# Patient Record
Sex: FEMALE | Race: WHITE | NOT HISPANIC OR LATINO | Employment: FULL TIME | URBAN - METROPOLITAN AREA
[De-identification: names, ages, dates, MRNs, and addresses within clinical notes are randomized per-mention and may not be internally consistent; named-entity substitution may affect disease eponyms.]

---

## 2023-09-21 PROBLEM — I26.93 SINGLE SUBSEGMENTAL PULMONARY EMBOLISM WITHOUT ACUTE COR PULMONALE (MULTI): Status: ACTIVE | Noted: 2023-09-21

## 2023-09-21 PROBLEM — R03.0 BLOOD PRESSURE ELEVATED WITHOUT HISTORY OF HTN: Status: ACTIVE | Noted: 2023-09-21

## 2023-09-21 PROBLEM — M47.816 LUMBAR SPONDYLOSIS: Status: ACTIVE | Noted: 2023-09-21

## 2023-09-21 PROBLEM — S22.059A: Status: ACTIVE | Noted: 2023-09-21

## 2023-09-21 PROBLEM — F43.89 ADJUSTMENT REACTION TO CHRONIC STRESS: Status: ACTIVE | Noted: 2023-09-21

## 2023-09-21 PROBLEM — R91.8 LUNG MASS: Status: ACTIVE | Noted: 2023-09-21

## 2023-09-21 PROBLEM — H81.10 BENIGN PAROXYSMAL POSITIONAL VERTIGO: Status: ACTIVE | Noted: 2023-09-21

## 2023-09-21 PROBLEM — M17.0 LOCALIZED OSTEOARTHRITIS OF KNEES, BILATERAL: Status: ACTIVE | Noted: 2023-09-21

## 2023-09-21 PROBLEM — F17.210 CIGARETTE SMOKER MOTIVATED TO QUIT: Status: ACTIVE | Noted: 2023-09-21

## 2023-09-21 PROBLEM — M24.559: Status: ACTIVE | Noted: 2023-09-21

## 2023-09-21 PROBLEM — F32.1 CURRENT MODERATE EPISODE OF MAJOR DEPRESSIVE DISORDER WITHOUT PRIOR EPISODE (MULTI): Status: ACTIVE | Noted: 2023-09-21

## 2023-09-21 PROBLEM — Z85.89 HISTORY OF CANCER METASTATIC TO BRAIN: Status: ACTIVE | Noted: 2023-09-21

## 2023-09-21 PROBLEM — S22.040A: Status: ACTIVE | Noted: 2023-09-21

## 2023-09-21 RX ORDER — LIDOCAINE AND PRILOCAINE 25; 25 MG/G; MG/G
CREAM TOPICAL
COMMUNITY

## 2023-09-21 RX ORDER — OXYCODONE HYDROCHLORIDE 5 MG/1
TABLET ORAL EVERY 6 HOURS PRN
COMMUNITY
Start: 2022-12-27

## 2023-09-21 RX ORDER — LORAZEPAM 0.5 MG/1
TABLET ORAL
COMMUNITY
Start: 2022-08-31

## 2023-09-21 RX ORDER — PAROXETINE HYDROCHLORIDE 40 MG/1
TABLET, FILM COATED ORAL
COMMUNITY

## 2023-09-21 RX ORDER — PREDNISONE 20 MG/1
TABLET ORAL
COMMUNITY
Start: 2021-02-16

## 2023-09-21 RX ORDER — PREDNISONE 50 MG/1
TABLET ORAL
COMMUNITY
Start: 2021-06-28

## 2023-09-27 PROBLEM — C34.90: Status: ACTIVE | Noted: 2023-09-27

## 2023-09-27 RX ORDER — HEPARIN SODIUM,PORCINE/PF 10 UNIT/ML
50 SYRINGE (ML) INTRAVENOUS AS NEEDED
OUTPATIENT
Start: 2023-10-20

## 2023-09-27 RX ORDER — HEPARIN 100 UNIT/ML
500 SYRINGE INTRAVENOUS AS NEEDED
OUTPATIENT
Start: 2023-10-20

## 2023-09-28 DIAGNOSIS — C34.90 SMALL CELL MALIGNANT NEOPLASM OF LUNG IN ADULT (MULTI): Primary | ICD-10-CM

## 2023-10-20 NOTE — PROGRESS NOTES
Consent:  Verbal consent was requested and obtained from patient on this date for a telehealth visit.    Patient ID: Christy Lizarraga is a 58 y.o. female.    Cancer History:   Treatment Synopsis:    1. Small cell lung cancer, limited stage, cT2N1 stage IIA  - Presented to ED 1/11/20 with SOB and cough, found to have RUL mass  - 1/12/20 CT abd/pelvis with indeterminate adrenal thickening   - 1/13/20 bronchoscopy revealed RUL mass, path c/w small cell   - 1/24/20 MRI brain neg  - 1/27/20 PET/CT FDG avid lung mass with extention into hilar, right paratracheal and precarinal LN, no distant mets  - C1D1 cis/etop 2/3/20  - 2/21/20 Enrolled to  and randomized to received cis/etop/atezo- C2 AND c3 delayed due to neturopenia  - 5/11/20 CT c/a/p significant reduction in primary mass, no progression  - 7/17/20 CT c/a/p and MRI brain no disease  - 11/2020 progression in brain sp GKRS  - 2/15/21 stop atezo due to pneumonitis concern    Subjective    HPI  A telephone visit (audio only) between the patient at home and the provider at [facility] [place] was utilized to provide this   telehealth service.     Ms. Christy Casarez is a 59 y/o F with a history of COPD, hypertension, tobacco use, who presents for follow-up for non-small cell lung cancer.     Since last visit, patient reports that her energy and appetite have improved since being on the hydrocortisone for her Shawn's disease. Denies any new cough, shortness of breath or pain. No complaints today. States her father has recently been diagnosed with colon cancer and family would like him referred to this clinic for monitoring.     Patient's past medical history, surgical history, family history and social history reviewed.     Objective    There were no vitals taken for this visit.     Review of Systems:   Review of Systems:    Positive per HPI, otherwise negative.     Physical Exam:   Exam deferred due to telephone call.    Performance  Status:  Symptomatic; fully ambulatory    Labs/Imaging/Pathology: personally reviewed reports and images in Epic electronic medical record system. Pertinent results as it related to the plan represented in below in assessment and plan.     Assessment/Plan    1. Small cell lung cancer, RUL, limited stage [id number] stage IIA  Patient initially presented 1/10/20 with new cough and shortness of breath.  Subsequent CT chest revealed a right upper lobe lesion measuring 2.4 x 3.6 x 2.2 cm.  No distant metastases seen on CT abdomen pelvis 1/13/20.  She underwent bronchoscopy 1/13/20  and biopsy of right upper lobe mass, final pathology revealed small cell carcinoma. MRI brain 1/24/20 negative. PET/CT 1/27/20 without distant mets. Started C1D1 cis/etop 2/3/20. Now enrolled in  and randomized to atezo plus cis/etop plus RT. C1  delayed due to prolonged neutropenia. C2 delayed due to neutropenia and sore throat. Repeat scans 5/11/20 with significant disease response, NM, tumor down 90%. Repeat MRI brain 7/17/20 with no mets.     - Since last visit, she continues to lose weight, feels weak, and overall today is having stress related to her financial situation and is having a hard time coping.  - Discussed that we could work with Social Work to see if there are any resources that would be helpful.   - Given she is having difficulty with co-pays we will do a phone visit in 6 weeks; but not formally.   - overall with significant depression and would benefit from psychiatry, will place referral to [facility]     10/31/22:  - She is 6 months out from recent scans.  - Did discuss that due to cost of imaging she prefers to limit. She would prefer to hold off on MRI brain until she has better clarification of her insurance.  - We will plan for Signatera today. She is ok to get stuck again from her port. Infusion nurse aware.   - RTC in 3 months for repeat CBC, CMP, and Signatera and we will plan for port flush in 6 weeks.      2/17/23:   - Routine follow up visit  - Overall no new changes or complaints  - Declines repeat scans, says she is still working through insurance changes/additions  - Referral for PT and optometrist entered  - Routine labs today via port  - Per Marce RN path for signatera insufficient so test cancelled  - Port flush in 6 weeks  - Dr. Leatha Mcnamara visit in 12 weeks  - 20 minutes spent directly with patient     5/22/23:  - Patient has lost a significant amount of weight and continues to have a persistent cough and SOB.   - She was recently in hospital in April for pneumonia and states despite antibiotics there has been no resolution.   - Did review after looking at her images that I do have concern there could be a neoplasm at the site of pneumonia and it would be useful to get a PET CT to further differentiate. She was agreeable to this plan.  - We will plan for a PET CT ideally within the week.  - She will call us with any new symptoms in the meantime and we will talk to her over the phone after the PET CT is completed if further treatment for her cancer is needed versus focusing on comfort.  - RTC for a phone visit after PET CT.      7/28/23:  - reviewed most recent PETCT with no evidence of recurrence  - labs unremarkable  - recent hospitalization found to have adrenal insufficiency , now improving with hydrocortisone  - pt very upset about wait today and would prefer followup in person or   - port flush in 6 weeks and with me in 12 weeks    10/23/23: Telephone call   - Patient denies any new symptoms.   - She has been dealing with her father who just got diagnosed with colon cancer.  - She still needs a port flush.   - We will plan for repeat scans in 3 months per her preference and we will check labs at that time with CBC and CBC.   - I will see her a few days after scans as an in-person visit.  - She will call us if she has any concerns or symptoms in the meantime.  - RTC in 3 months with labs and scans prior.      2. Weight loss  - now improving  - I do think a large component could be related to stress and losing her granddaughter, now with less stress after her  passed  - She does not eat regularly  - Will involve our dietician Ebony to help her increase calories      3. Cancer related pain  4. Weakness in legs and muscle spasms   - now much improved after RFA to lumbar spine  - Underwent LP and paraneoplastic panel both negative  - Patient described that her symptoms were overall improving but continues to have significant muscle spasms and pain  - We reviewed her medications and no clear culprits  - We do think apixaban may be contributing given the timing of her symptoms   - Will plan to hold Apixaban given her PE was over 3 months ago and she has had CT scans without any embolism identified  - Working with Dr. Ruiz  - Will provide refill for oxycodone      5. Pulmonary Embolism   - Will hold apixaban for now  - Has completed over 3 months of therapy  - Will monitor     6. Poor appetite   - During her recent hospitalization she was started on Remeron, and I advised her to continue  - Patient also had questionable thrush and those symptoms have since improved  - Patient understands the importance of hydration and PO intake   - A dietician will come to her home today     7. TAMARA  - Improved creatine to 1.33 as of 4/30/21  - Patient does states that she is increasing hydration and by mouth intake  - Followed now by nephrology       8. Anxiety  - Mostly related to her granddaughter who relate has a terminal malignancy and patient is on Paxil, which is helping  - Will provide a refill for ativan as well today     9. Mouth sores  - now resolved  - Will prescription BMX mouth wash to see if symptoms improve. She does have some active sores now  - Will also check CBC to assess counts     10.  Pneumonitis  - Secondary to atezolizumab   - Will monitor  - no further immunotherapy in the future     11. Diarrhea  - now  resolved  - in general they have been stress related     12. Sinus symptoms  - On Allegra manageable currently     13. Insomnia  - continue melatonin     14. COPD  - SOB improved since hospitalization. Will monitor.     15. Tobacco use  - Resumed after recent family stressors. Previously on Wellbutrin  - not ready for other measures to help with cessation at this time     16. Contrast allergy  - Premedicate with prednisone 12 hours, 6 hours and 1 hr prior with benadryl 1 hr prior with future scans  refills already provided for future and patient confirmed they are available with pharmacy     RTC in 3 months with labs and scans prior. This note has been transcribed using a medical scribe and there is a possibility of unintentional typing misprints.          Leatah Mcnamara MD  Hematology/Oncology  [facility]    Scribe Attestation  By signing my name below, [employer] Scribe attest that this documentation has been prepared under the direction and in the presence of Leatha Mcnamara MD.

## 2023-10-23 ENCOUNTER — TELEMEDICINE (OUTPATIENT)
Dept: HEMATOLOGY/ONCOLOGY | Facility: CLINIC | Age: 58
End: 2023-10-23
Payer: COMMERCIAL

## 2023-10-23 DIAGNOSIS — C34.90 SMALL CELL MALIGNANT NEOPLASM OF LUNG IN ADULT (MULTI): Primary | ICD-10-CM

## 2023-10-23 PROCEDURE — 99213 OFFICE O/P EST LOW 20 MIN: CPT | Performed by: INTERNAL MEDICINE

## 2023-10-26 ENCOUNTER — TELEPHONE (OUTPATIENT)
Dept: HEMATOLOGY/ONCOLOGY | Facility: CLINIC | Age: 58
End: 2023-10-26
Payer: COMMERCIAL

## 2023-10-26 NOTE — TELEPHONE ENCOUNTER
Internal phone note -PT no show last infusion appt Pending appt requests Called left generic VM to call [facility] to schedule

## 2023-12-07 ENCOUNTER — TELEPHONE (OUTPATIENT)
Dept: HEMATOLOGY/ONCOLOGY | Facility: CLINIC | Age: 58
End: 2023-12-07
Payer: COMMERCIAL

## 2023-12-07 NOTE — TELEPHONE ENCOUNTER
I left a generic message on the patient's VM notifying her that she missed her appt today at 1:45 PM and I asked her to call the clinic to notify if she is still going to come in or if she needs to reschedule. Call back information was left and we will await her call.

## 2024-01-22 ENCOUNTER — TELEPHONE (OUTPATIENT)
Dept: HEMATOLOGY/ONCOLOGY | Facility: CLINIC | Age: 59
End: 2024-01-22
Payer: COMMERCIAL

## 2024-01-22 NOTE — TELEPHONE ENCOUNTER
Message left for Christy about her canceled PET scan and follow up with Dr. Mcnamara with renae doug[facility]. Office number was left. I encouraged her to call  back to reschedule all appointments and to answer any questions. I will wait her return call.

## 2024-01-23 ENCOUNTER — APPOINTMENT (OUTPATIENT)
Dept: RADIOLOGY | Facility: CLINIC | Age: 59
End: 2024-01-23
Payer: COMMERCIAL

## 2024-01-23 ENCOUNTER — APPOINTMENT (OUTPATIENT)
Dept: HEMATOLOGY/ONCOLOGY | Facility: CLINIC | Age: 59
End: 2024-01-23
Payer: COMMERCIAL

## 2024-10-31 RX ORDER — HEPARIN SODIUM,PORCINE/PF 10 UNIT/ML
50 SYRINGE (ML) INTRAVENOUS AS NEEDED
OUTPATIENT
Start: 2024-10-31

## 2024-10-31 RX ORDER — HEPARIN 100 UNIT/ML
500 SYRINGE INTRAVENOUS AS NEEDED
OUTPATIENT
Start: 2024-10-31

## 2025-02-10 ENCOUNTER — TELEPHONE (OUTPATIENT)
Dept: HEMATOLOGY/ONCOLOGY | Facility: CLINIC | Age: 60
End: 2025-02-10
Payer: COMMERCIAL

## 2025-02-10 NOTE — TELEPHONE ENCOUNTER
VM Calling to re-establish with Dr. Mcnamara.  Was in remission, but believes it is back to being active and growing again.

## 2025-02-22 NOTE — PROGRESS NOTES
Patient ID: Christy Lizarraga is a 59 y.o. female.    Cancer History:   Treatment Synopsis:    1. Small cell lung cancer, limited stage, cT2N1 stage IIA  - Presented to ED 1/11/20 with SOB and cough, found to have RUL mass  - 1/12/20 CT abd/pelvis with indeterminate adrenal thickening   - 1/13/20 bronchoscopy revealed RUL mass, path c/w small cell   - 1/24/20 MRI brain neg  - 1/27/20 PET/CT FDG avid lung mass with extention into hilar, right paratracheal and precarinal LN, no distant mets  - C1D1 cis/etop 2/3/20  - 2/21/20 Enrolled to  and randomized to received cis/etop/atezo- C2 AND c3 delayed due to neturopenia  - 5/11/20 CT c/a/p significant reduction in primary mass, no progression  - 7/17/20 CT c/a/p and MRI brain no disease  - 11/2020 progression in brain sp GKRS  - 2/15/21 stop atezo due to pneumonitis concern    Subjective    HPI  Ms. Christy Casarez is a 57 y/o F with a history of COPD, hypertension, tobacco use, who presents for follow-up for non-small cell lung cancer.     Today, she describes shortness of breath and wheezing for approximately 4-6 weeks, as well as dizziness. She has also noticed that her hair and nails are not growing and recurrent respiratory tract infections.     She denies fever, unintentional weight loss, night sweats, flu like symptoms, headaches, slurred speech, tripping, bone pains, constipation, diarrhea, nausea.     She lives at home with her son; her daughter and daughter's boyfriend will be moving in shortly.     Patient's past medical history, surgical history, family history and social history reviewed.        Review of Systems:   Review of Systems:    Positive per HPI, otherwise negative.         Objective    BSA: 1.95 meters squared  /77 (BP Location: Right arm, Patient Position: Sitting)   Pulse 102   Temp 36.5 °C (97.7 °F) (Temporal)   Resp 20   Wt 81.9 kg (180 lb 8.9 oz)   SpO2 93%   BMI 29.23 kg/m²        Physical Exam  Gen: appears well in clinic,  NAD  HEENT: atraumatic head, normocephalic, EOMI, conjunctiva normal  LUNG: no increased WOB, CTAB  CV: No JVD. RRR  GI: soft, NT, ND  LE: no LE edema  Skin: no obvious rashes or lesions on visible skin  Neuro: interactive, no focal deficits noted  Psych: normal mood and affect    Performance Status:  Symptomatic; fully ambulatory    Labs/Imaging/Pathology: Personally reviewed reports and images in Epic electronic medical record system. Pertinent results as it related to the plan represented in below in assessment and plan.       Assessment/Plan   1. Small cell lung cancer, RUL, limited stage [id number] stage IIA1. Small cell lung cancer, limited stage, cT2N1 stage IIA  - Presented to ED 1/11/20 with SOB and cough, found to have RUL mass  - 1/12/20 CT abd/pelvis with indeterminate adrenal thickening   - 1/13/20 bronchoscopy revealed RUL mass, path c/w small cell   - 1/24/20 MRI brain neg  - 1/27/20 PET/CT FDG avid lung mass with extention into hilar, right paratracheal and precarinal LN, no distant mets  - C1D1 cis/etop 2/3/20  - 2/21/20 Enrolled to  and randomized to received cis/etop/atezo- C2 AND c3 delayed due to neturopenia  - 5/11/20 CT c/a/p significant reduction in primary mass, no progression  - 7/17/20 CT c/a/p and MRI brain no disease  - 11/2020 progression in brain sp GKRS  - 2/15/21 stop atezo due to pneumonitis concern       2/25/25:   - Patient has not had follow up in over 1 year   - Her last PET scan was in June 2023   - She now has increased wheezing, pressure and pain in the chest. She is concerned about recurrence.    - We will so staging CT C/A/P   - She denies fever, unintentional weight loss, night sweats, flu like symptoms, headaches, slurred speech, tripping, bone pains, constipation, diarrhea, nausea.    - RTC in 2 weeks for phone/video visit to review CT scans          2.Contrast allergy  - Premedicate with prednisone 12 hours, 6 hours and 1 hr prior with benadryl 1 hr prior with future  scans  refills already provided for future and patient confirmed they are available with pharmacy     RTC in 2 weeks for phone/video visit to review CT scans  This note has been transcribed using a medical scribe and there is a possibility of unintentional typing misprints      Diagnoses and all orders for this visit:  Small cell malignant neoplasm of lung in adult (Multi)  -     CT chest abdomen pelvis w and wo IV contrast; Future  -     Creatinine, Serum; Future  -     CBC and Auto Differential; Future  -     Comprehensive Metabolic Panel; Future  -     TSH with reflex to Free T4 if abnormal; Future  -     Magnesium; Future  -     Clinic Appointment Request Virtual Est (virtual 1-2 days after CT); Future  Hormone resistant malignancy status  -     TSH with reflex to Free T4 if abnormal; Future    Leatha Mcnamara MD  Hematology/Oncology  [facility]      Scribe Attestation  By signing my name below, [employer]aud[employer] Scribe, attest that this documentation has been prepared under the direction and in the presence of Leatha Mcnamara MD.    Time Spent  Prep time on day of patient encounter: 5 minutes  Time spent directly with patient, family or caregiver: 25 minutes  Additional Time Spent on Patient Care Activities: 5 minutes  Documentation Time: 5 minutes  Other Time Spent: 0 minutes  Total: 40 minutes

## 2025-02-25 ENCOUNTER — OFFICE VISIT (OUTPATIENT)
Dept: HEMATOLOGY/ONCOLOGY | Facility: CLINIC | Age: 60
End: 2025-02-25
Payer: COMMERCIAL

## 2025-02-25 ENCOUNTER — LAB (OUTPATIENT)
Dept: LAB | Facility: CLINIC | Age: 60
End: 2025-02-25
Payer: COMMERCIAL

## 2025-02-25 VITALS
WEIGHT: 180.56 LBS | HEART RATE: 102 BPM | BODY MASS INDEX: 29.23 KG/M2 | RESPIRATION RATE: 20 BRPM | DIASTOLIC BLOOD PRESSURE: 77 MMHG | TEMPERATURE: 97.7 F | SYSTOLIC BLOOD PRESSURE: 133 MMHG | OXYGEN SATURATION: 93 %

## 2025-02-25 DIAGNOSIS — C34.90 SMALL CELL MALIGNANT NEOPLASM OF LUNG IN ADULT (MULTI): Primary | ICD-10-CM

## 2025-02-25 DIAGNOSIS — Z19.2 HORMONE RESISTANT MALIGNANCY STATUS: ICD-10-CM

## 2025-02-25 DIAGNOSIS — C34.90 SMALL CELL MALIGNANT NEOPLASM OF LUNG IN ADULT (MULTI): ICD-10-CM

## 2025-02-25 LAB
ALBUMIN SERPL BCP-MCNC: 4.3 G/DL (ref 3.4–5)
ALP SERPL-CCNC: 70 U/L (ref 33–136)
ALT SERPL W P-5'-P-CCNC: 7 U/L (ref 7–45)
ANION GAP SERPL CALC-SCNC: 14 MMOL/L (ref 10–20)
AST SERPL W P-5'-P-CCNC: 11 U/L (ref 9–39)
BASOPHILS # BLD AUTO: 0.05 X10*3/UL (ref 0–0.1)
BASOPHILS NFR BLD AUTO: 0.6 %
BILIRUB SERPL-MCNC: 0.4 MG/DL (ref 0–1.2)
BUN SERPL-MCNC: 13 MG/DL (ref 6–23)
CALCIUM SERPL-MCNC: 9.3 MG/DL (ref 8.6–10.3)
CHLORIDE SERPL-SCNC: 105 MMOL/L (ref 98–107)
CO2 SERPL-SCNC: 23 MMOL/L (ref 21–32)
CREAT SERPL-MCNC: 0.98 MG/DL (ref 0.5–1.05)
EGFRCR SERPLBLD CKD-EPI 2021: 66 ML/MIN/1.73M*2
EOSINOPHIL # BLD AUTO: 0.33 X10*3/UL (ref 0–0.7)
EOSINOPHIL NFR BLD AUTO: 3.7 %
ERYTHROCYTE [DISTWIDTH] IN BLOOD BY AUTOMATED COUNT: 12.3 % (ref 11.5–14.5)
GLUCOSE SERPL-MCNC: 112 MG/DL (ref 74–99)
HCT VFR BLD AUTO: 38.1 % (ref 36–46)
HGB BLD-MCNC: 13.3 G/DL (ref 12–16)
IMM GRANULOCYTES # BLD AUTO: 0.01 X10*3/UL (ref 0–0.7)
IMM GRANULOCYTES NFR BLD AUTO: 0.1 % (ref 0–0.9)
LYMPHOCYTES # BLD AUTO: 2.06 X10*3/UL (ref 1.2–4.8)
LYMPHOCYTES NFR BLD AUTO: 23.1 %
MAGNESIUM SERPL-MCNC: 1.9 MG/DL (ref 1.6–2.4)
MCH RBC QN AUTO: 33.1 PG (ref 26–34)
MCHC RBC AUTO-ENTMCNC: 34.9 G/DL (ref 32–36)
MCV RBC AUTO: 95 FL (ref 80–100)
MONOCYTES # BLD AUTO: 0.45 X10*3/UL (ref 0.1–1)
MONOCYTES NFR BLD AUTO: 5 %
NEUTROPHILS # BLD AUTO: 6.02 X10*3/UL (ref 1.2–7.7)
NEUTROPHILS NFR BLD AUTO: 67.5 %
PLATELET # BLD AUTO: 341 X10*3/UL (ref 150–450)
POTASSIUM SERPL-SCNC: 4.7 MMOL/L (ref 3.5–5.3)
PROT SERPL-MCNC: 7.1 G/DL (ref 6.4–8.2)
RBC # BLD AUTO: 4.02 X10*6/UL (ref 4–5.2)
SODIUM SERPL-SCNC: 137 MMOL/L (ref 136–145)
TSH SERPL-ACNC: 3.19 MIU/L (ref 0.44–3.98)
WBC # BLD AUTO: 8.9 X10*3/UL (ref 4.4–11.3)

## 2025-02-25 PROCEDURE — 85025 COMPLETE CBC W/AUTO DIFF WBC: CPT

## 2025-02-25 PROCEDURE — 80053 COMPREHEN METABOLIC PANEL: CPT

## 2025-02-25 PROCEDURE — 99215 OFFICE O/P EST HI 40 MIN: CPT | Performed by: INTERNAL MEDICINE

## 2025-02-25 PROCEDURE — 36415 COLL VENOUS BLD VENIPUNCTURE: CPT

## 2025-02-25 PROCEDURE — 83735 ASSAY OF MAGNESIUM: CPT

## 2025-02-25 PROCEDURE — 84443 ASSAY THYROID STIM HORMONE: CPT

## 2025-02-25 RX ORDER — IBUPROFEN 200 MG
200 TABLET ORAL EVERY 6 HOURS
COMMUNITY

## 2025-02-25 RX ORDER — ACETAMINOPHEN, DIPHENHYDRAMINE HCL, PHENYLEPHRINE HCL 325; 25; 5 MG/1; MG/1; MG/1
5 TABLET ORAL NIGHTLY
COMMUNITY

## 2025-02-25 RX ORDER — HYDROCORTISONE 5 MG/1
7.5 TABLET ORAL DAILY
COMMUNITY

## 2025-02-25 ASSESSMENT — PAIN SCALES - GENERAL: PAINLEVEL_OUTOF10: 2

## 2025-02-28 ENCOUNTER — APPOINTMENT (OUTPATIENT)
Dept: ENDOCRINOLOGY | Facility: CLINIC | Age: 60
End: 2025-02-28
Payer: COMMERCIAL

## 2025-03-11 ENCOUNTER — APPOINTMENT (OUTPATIENT)
Dept: RADIOLOGY | Facility: CLINIC | Age: 60
End: 2025-03-11
Payer: COMMERCIAL

## 2025-03-14 ENCOUNTER — APPOINTMENT (OUTPATIENT)
Dept: HEMATOLOGY/ONCOLOGY | Facility: CLINIC | Age: 60
End: 2025-03-14
Payer: COMMERCIAL

## 2025-05-28 ENCOUNTER — APPOINTMENT (OUTPATIENT)
Dept: ENDOCRINOLOGY | Facility: CLINIC | Age: 60
End: 2025-05-28
Payer: COMMERCIAL